# Patient Record
Sex: FEMALE | NOT HISPANIC OR LATINO | URBAN - METROPOLITAN AREA
[De-identification: names, ages, dates, MRNs, and addresses within clinical notes are randomized per-mention and may not be internally consistent; named-entity substitution may affect disease eponyms.]

---

## 2020-08-28 ENCOUNTER — IMPORTED ENCOUNTER (OUTPATIENT)
Dept: URBAN - METROPOLITAN AREA CLINIC 1 | Facility: CLINIC | Age: 45
End: 2020-08-28

## 2020-08-28 PROBLEM — H52.13: Noted: 2020-08-28

## 2020-08-28 PROBLEM — H52.4: Noted: 2020-08-28

## 2020-08-28 PROCEDURE — S0620 ROUTINE OPHTHALMOLOGICAL EXA: HCPCS

## 2020-08-28 NOTE — PATIENT DISCUSSION
1. Myopia: Rx was given for correction if indicated and requested. 2. Presbyopia3. CTL Trials given (Dailies Total One MF) secondary to pt difficulties reading up close with current CTLs. 4.  MOO - Recommend ATs BID OU routinely. Return for an appointment in 1 week cc with Dr. Keegan Tilley. Patient baseline mental status

## 2020-09-09 ENCOUNTER — IMPORTED ENCOUNTER (OUTPATIENT)
Dept: URBAN - METROPOLITAN AREA CLINIC 1 | Facility: CLINIC | Age: 45
End: 2020-09-09

## 2020-09-09 NOTE — PATIENT DISCUSSION
1. CC Today -- Finalized CTLs today. Good comfort fit and vision. Return for an appointment in 1 year 40/cc with Dr. Tanya Isaac.

## 2020-11-18 ENCOUNTER — IMPORTED ENCOUNTER (OUTPATIENT)
Dept: URBAN - METROPOLITAN AREA CLINIC 1 | Facility: CLINIC | Age: 45
End: 2020-11-18

## 2020-11-18 NOTE — PATIENT DISCUSSION
1.  CC today- Finalized CTL MRx today Dailies Total One -4.75. Good fit vision and comfortReturn for an appointment in 1 yr 40/cc with Dr. Bharati Rivera.

## 2021-08-16 ENCOUNTER — IMPORTED ENCOUNTER (OUTPATIENT)
Dept: URBAN - METROPOLITAN AREA CLINIC 1 | Facility: CLINIC | Age: 46
End: 2021-08-16

## 2021-08-16 PROBLEM — H16.011: Noted: 2021-08-16

## 2021-08-16 PROCEDURE — 92012 INTRM OPH EXAM EST PATIENT: CPT

## 2021-08-16 NOTE — PATIENT DISCUSSION
1.  Corneal Ulcer OD -- Begin Besivance Q2Hrs OD (Erx'd & Coupon given). Begin ATs Q1-2Hrs OD. Return immediately if ulcer larger or symptoms worsen. No contact lens use. 2.  Dry Eyes OU -- Increase ATs to Q1-2Hrs OD BID OS (Sample of PF Refresh Carter 3's given). 3.  Corneal Scar OD -- Observe. 4.  CTL wear -- D/c secondary to Ulcer. Return for an appointment on Thursday 10 with Dr. Cara Nicolas.

## 2021-09-01 ENCOUNTER — IMPORTED ENCOUNTER (OUTPATIENT)
Dept: URBAN - METROPOLITAN AREA CLINIC 1 | Facility: CLINIC | Age: 46
End: 2021-09-01

## 2021-09-01 PROBLEM — H52.13: Noted: 2021-09-01

## 2021-09-01 PROBLEM — H52.4: Noted: 2021-09-01

## 2021-09-01 PROCEDURE — S0621 ROUTINE OPHTHALMOLOGICAL EXA: HCPCS

## 2021-09-01 NOTE — PATIENT DISCUSSION
1. Myopia: Rx was given for correction if indicated and requested. 2. Presbyopia3. CTL Final rx given today (Dailies Total One MF) -4.25 OU w/ low add. 4. MOO w/ PEK OU - Recommend ATs TID OU routinely. (sample of Refresh given)5. H/o K Ulcer OD - resolved. Return for an appointment in 1 year for 40/cc with Dr. Mason Ordonez.

## 2022-04-02 ASSESSMENT — VISUAL ACUITY
OS_SC: 20/20-1
OD_SC: 20/20
OU_SC: 20/20
OS_SC: 20/20
OD_CC: J5
OD_CC: J1
OS_CC: J2
OU_CC: J2
OS_SC: J1+
OS_SC: 20/20
OS_SC: 20/20-1
OD_SC: 20/20
OD_SC: J1+
OS_CC: J1
OD_SC: 20/20
OD_SC: 20/20
OS_SC: 20/20
OD_SC: 20/20

## 2022-04-02 ASSESSMENT — TONOMETRY
OS_IOP_MMHG: 14
OD_IOP_MMHG: 15
OS_IOP_MMHG: 14
OS_IOP_MMHG: 14
OD_IOP_MMHG: 14
OD_IOP_MMHG: 14

## 2022-09-02 ENCOUNTER — COMPREHENSIVE EXAM (OUTPATIENT)
Dept: URBAN - METROPOLITAN AREA CLINIC 1 | Facility: CLINIC | Age: 47
End: 2022-09-02

## 2022-09-02 DIAGNOSIS — H52.13: ICD-10-CM

## 2022-09-02 DIAGNOSIS — H52.4: ICD-10-CM

## 2022-09-02 PROCEDURE — 92014 COMPRE OPH EXAM EST PT 1/>: CPT

## 2022-09-02 ASSESSMENT — VISUAL ACUITY
OD_CC: 20/20-1
OS_CC: J2
OS_CC: 20/20
OD_CC: J2

## 2022-09-02 ASSESSMENT — TONOMETRY
OD_IOP_MMHG: 14
OS_IOP_MMHG: 14

## 2022-09-08 ENCOUNTER — CONTACT LENSES/GLASSES VISIT (OUTPATIENT)
Dept: URBAN - METROPOLITAN AREA CLINIC 1 | Facility: CLINIC | Age: 47
End: 2022-09-08

## 2022-09-08 DIAGNOSIS — H52.13: ICD-10-CM

## 2022-09-08 DIAGNOSIS — H52.4: ICD-10-CM

## 2022-09-08 PROCEDURE — 92014 COMPRE OPH EXAM EST PT 1/>: CPT

## 2022-09-08 ASSESSMENT — VISUAL ACUITY
OS_CC: J1+
OD_CC: 20/20
OS_CC: 20/20
OD_CC: J1+

## 2023-05-19 ENCOUNTER — COMPREHENSIVE EXAM (OUTPATIENT)
Dept: URBAN - METROPOLITAN AREA CLINIC 2 | Facility: CLINIC | Age: 48
End: 2023-05-19

## 2023-05-19 DIAGNOSIS — H52.223: ICD-10-CM

## 2023-05-19 DIAGNOSIS — H52.4: ICD-10-CM

## 2023-05-19 DIAGNOSIS — H52.13: ICD-10-CM

## 2023-05-19 PROCEDURE — 92014 COMPRE OPH EXAM EST PT 1/>: CPT

## 2023-05-19 PROCEDURE — 92310-12 CONTACT LENS FITTING - 90

## 2023-05-19 PROCEDURE — 92015 DETERMINE REFRACTIVE STATE: CPT

## 2023-05-19 ASSESSMENT — KERATOMETRY
OS_K2POWER_DIOPTERS: 45.25
OD_AXISANGLE_DEGREES: 28
OS_AXISANGLE_DEGREES: 162
OD_K2POWER_DIOPTERS: 45.00
OD_AXISANGLE2_DEGREES: 118
OD_K1POWER_DIOPTERS: 44.50
OS_AXISANGLE2_DEGREES: 072
OS_K1POWER_DIOPTERS: 44.50

## 2023-05-19 ASSESSMENT — VISUAL ACUITY
OD_CC: J7
OS_CC: 20/20
OD_CC: 20/20
OS_CC: J7

## 2023-05-19 ASSESSMENT — TONOMETRY
OS_IOP_MMHG: 13
OD_IOP_MMHG: 13

## 2023-05-26 ENCOUNTER — CONTACT LENSES/GLASSES VISIT (OUTPATIENT)
Dept: URBAN - METROPOLITAN AREA CLINIC 2 | Facility: CLINIC | Age: 48
End: 2023-05-26

## 2023-05-26 DIAGNOSIS — Z46.0: ICD-10-CM

## 2023-05-26 PROCEDURE — 92310-F CONTACT LENS FITTING FOLLOW UP

## 2023-05-26 ASSESSMENT — KERATOMETRY
OD_K1POWER_DIOPTERS: 44.50
OD_K2POWER_DIOPTERS: 45.00
OD_AXISANGLE2_DEGREES: 118
OS_AXISANGLE_DEGREES: 162
OS_AXISANGLE2_DEGREES: 072
OD_AXISANGLE_DEGREES: 28
OS_K2POWER_DIOPTERS: 45.25
OS_K1POWER_DIOPTERS: 44.50

## 2023-05-26 ASSESSMENT — VISUAL ACUITY
OD_CC: J3
OS_CC: 20/30
OU_CC: 20/20
OU_CC: J1+
OS_CC: J1+

## 2024-05-22 ENCOUNTER — COMPREHENSIVE EXAM (OUTPATIENT)
Dept: URBAN - METROPOLITAN AREA CLINIC 2 | Facility: CLINIC | Age: 49
End: 2024-05-22

## 2024-05-22 DIAGNOSIS — H52.4: ICD-10-CM

## 2024-05-22 DIAGNOSIS — H52.13: ICD-10-CM

## 2024-05-22 DIAGNOSIS — H52.223: ICD-10-CM

## 2024-05-22 PROCEDURE — 92014 COMPRE OPH EXAM EST PT 1/>: CPT

## 2024-05-22 PROCEDURE — 92015 DETERMINE REFRACTIVE STATE: CPT

## 2024-05-22 PROCEDURE — 92310-12 CONTACT LENS FITTING - 90: Mod: 21

## 2024-05-22 ASSESSMENT — KERATOMETRY
OD_AXISANGLE2_DEGREES: 118
OS_AXISANGLE2_DEGREES: 072
OS_AXISANGLE_DEGREES: 162
OD_AXISANGLE_DEGREES: 28
OS_K2POWER_DIOPTERS: 45.25
OS_K1POWER_DIOPTERS: 44.50
OD_K2POWER_DIOPTERS: 45.00
OD_K1POWER_DIOPTERS: 44.50

## 2024-05-22 ASSESSMENT — TONOMETRY
OS_IOP_MMHG: 15
OD_IOP_MMHG: 15

## 2024-05-22 ASSESSMENT — VISUAL ACUITY
OS_CC: 20/20-2
OU_CC: 20/25
OD_CC: 20/20

## 2024-08-08 ENCOUNTER — EMERGENCY VISIT (OUTPATIENT)
Dept: URBAN - METROPOLITAN AREA CLINIC 2 | Facility: CLINIC | Age: 49
End: 2024-08-08

## 2024-08-08 DIAGNOSIS — H04.123: ICD-10-CM

## 2024-08-08 DIAGNOSIS — H16.143: ICD-10-CM

## 2024-08-08 DIAGNOSIS — S05.02XA: ICD-10-CM

## 2024-08-08 PROCEDURE — 92012 INTRM OPH EXAM EST PATIENT: CPT

## 2024-08-08 ASSESSMENT — TONOMETRY
OS_IOP_MMHG: 15
OD_IOP_MMHG: 15

## 2024-08-08 ASSESSMENT — VISUAL ACUITY
OD_CC: 20/20
OS_CC: 20/30
OS_PH: 20/20

## 2024-08-08 ASSESSMENT — KERATOMETRY
OS_K2POWER_DIOPTERS: 45.25
OD_K1POWER_DIOPTERS: 44.50
OS_K1POWER_DIOPTERS: 44.50
OS_AXISANGLE2_DEGREES: 072
OD_AXISANGLE2_DEGREES: 118
OS_AXISANGLE_DEGREES: 162
OD_K2POWER_DIOPTERS: 45.00
OD_AXISANGLE_DEGREES: 28

## 2024-08-14 ENCOUNTER — FOLLOW UP (OUTPATIENT)
Dept: URBAN - METROPOLITAN AREA CLINIC 2 | Facility: CLINIC | Age: 49
End: 2024-08-14

## 2024-08-14 DIAGNOSIS — H16.143: ICD-10-CM

## 2024-08-14 DIAGNOSIS — H04.123: ICD-10-CM

## 2024-08-14 DIAGNOSIS — H17.12: ICD-10-CM

## 2024-08-14 DIAGNOSIS — S05.02XA: ICD-10-CM

## 2024-08-14 PROCEDURE — 92012 INTRM OPH EXAM EST PATIENT: CPT

## 2024-08-14 ASSESSMENT — KERATOMETRY
OD_K2POWER_DIOPTERS: 45.00
OS_K2POWER_DIOPTERS: 45.25
OS_AXISANGLE_DEGREES: 162
OS_K1POWER_DIOPTERS: 44.50
OS_AXISANGLE2_DEGREES: 072
OD_AXISANGLE2_DEGREES: 118
OD_AXISANGLE_DEGREES: 28
OD_K1POWER_DIOPTERS: 44.50

## 2024-08-14 ASSESSMENT — VISUAL ACUITY
OU_CC: 20/25
OD_CC: 20/20
OS_CC: 20/20-1

## 2024-08-14 ASSESSMENT — TONOMETRY
OD_IOP_MMHG: 15
OS_IOP_MMHG: 15

## 2024-10-01 ENCOUNTER — EMERGENCY VISIT (OUTPATIENT)
Dept: URBAN - METROPOLITAN AREA CLINIC 2 | Facility: CLINIC | Age: 49
End: 2024-10-01

## 2024-10-01 DIAGNOSIS — H04.123: ICD-10-CM

## 2024-10-01 DIAGNOSIS — H16.001: ICD-10-CM

## 2024-10-01 PROCEDURE — 92012 INTRM OPH EXAM EST PATIENT: CPT

## 2024-10-11 ENCOUNTER — FOLLOW UP (OUTPATIENT)
Dept: URBAN - METROPOLITAN AREA CLINIC 2 | Facility: CLINIC | Age: 49
End: 2024-10-11

## 2024-10-11 DIAGNOSIS — H16.001: ICD-10-CM

## 2024-10-11 DIAGNOSIS — H04.123: ICD-10-CM

## 2024-10-11 PROCEDURE — 92012 INTRM OPH EXAM EST PATIENT: CPT

## 2025-06-06 ENCOUNTER — COMPREHENSIVE EXAM (OUTPATIENT)
Age: 50
End: 2025-06-06

## 2025-06-06 DIAGNOSIS — Z01.00: ICD-10-CM

## 2025-06-06 DIAGNOSIS — Z46.0: ICD-10-CM

## 2025-06-06 DIAGNOSIS — H52.13: ICD-10-CM

## 2025-06-06 PROCEDURE — 92014 COMPRE OPH EXAM EST PT 1/>: CPT

## 2025-06-06 PROCEDURE — 92015 DETERMINE REFRACTIVE STATE: CPT

## 2025-06-06 PROCEDURE — 92310-3 LEVEL 3 SOFT LENS UPDATE
